# Patient Record
Sex: FEMALE | Race: WHITE | NOT HISPANIC OR LATINO | Employment: OTHER | URBAN - NONMETROPOLITAN AREA
[De-identification: names, ages, dates, MRNs, and addresses within clinical notes are randomized per-mention and may not be internally consistent; named-entity substitution may affect disease eponyms.]

---

## 2023-07-07 ENCOUNTER — OFFICE VISIT (OUTPATIENT)
Dept: URGENT CARE | Facility: CLINIC | Age: 83
End: 2023-07-07
Payer: MEDICARE

## 2023-07-07 VITALS
SYSTOLIC BLOOD PRESSURE: 116 MMHG | OXYGEN SATURATION: 97 % | WEIGHT: 143 LBS | HEART RATE: 60 BPM | TEMPERATURE: 97.7 F | RESPIRATION RATE: 18 BRPM | DIASTOLIC BLOOD PRESSURE: 56 MMHG

## 2023-07-07 DIAGNOSIS — K04.7 DENTAL INFECTION: Primary | ICD-10-CM

## 2023-07-07 PROBLEM — M15.9 GENERALIZED OSTEOARTHRITIS: Status: ACTIVE | Noted: 2023-01-10

## 2023-07-07 PROBLEM — F33.1 MODERATE RECURRENT MAJOR DEPRESSION (HCC): Status: ACTIVE | Noted: 2023-01-10

## 2023-07-07 PROBLEM — K21.9 GASTROESOPHAGEAL REFLUX DISEASE WITHOUT ESOPHAGITIS: Status: ACTIVE | Noted: 2023-01-10

## 2023-07-07 PROCEDURE — 99203 OFFICE O/P NEW LOW 30 MIN: CPT | Performed by: PHYSICIAN ASSISTANT

## 2023-07-07 PROCEDURE — G0463 HOSPITAL OUTPT CLINIC VISIT: HCPCS | Performed by: PHYSICIAN ASSISTANT

## 2023-07-07 RX ORDER — FERROUS SULFATE 325(65) MG
1 TABLET ORAL
COMMUNITY
Start: 2023-01-10

## 2023-07-07 RX ORDER — PANTOPRAZOLE SODIUM 40 MG/1
TABLET, DELAYED RELEASE ORAL
COMMUNITY
Start: 2023-04-16

## 2023-07-07 RX ORDER — ALENDRONATE SODIUM 70 MG/1
TABLET ORAL
COMMUNITY
Start: 2023-05-18

## 2023-07-07 RX ORDER — CLINDAMYCIN HYDROCHLORIDE 300 MG/1
300 CAPSULE ORAL 3 TIMES DAILY
Qty: 21 CAPSULE | Refills: 0 | Status: SHIPPED | OUTPATIENT
Start: 2023-07-07 | End: 2023-07-14

## 2023-07-07 RX ORDER — LISINOPRIL 20 MG/1
TABLET ORAL
COMMUNITY
Start: 2023-05-26

## 2023-07-07 RX ORDER — MELOXICAM 7.5 MG/1
TABLET ORAL
COMMUNITY
Start: 2023-05-30

## 2023-07-07 RX ORDER — OXYBUTYNIN CHLORIDE 15 MG/1
TABLET, EXTENDED RELEASE ORAL
COMMUNITY
Start: 2023-06-06

## 2023-07-07 RX ORDER — CITALOPRAM 20 MG/1
TABLET ORAL
COMMUNITY
Start: 2023-05-02

## 2023-07-07 NOTE — PATIENT INSTRUCTIONS
Dental Abscess   WHAT YOU NEED TO KNOW:   A dental abscess is a collection of pus in or around a tooth. A dental abscess is caused by bacteria. The bacteria can enter the tooth when the enamel (outer part of the tooth) is damaged by tooth decay. Bacteria can also enter the tooth through a chip in the tooth or a cut in the gum. Food particles that are stuck between the teeth for a long time may also lead to an abscess. DISCHARGE INSTRUCTIONS:   Return to the emergency department if:   You have severe pain in your tooth or jaw. You have trouble breathing because of pain or swelling. Call your doctor if:   Your symptoms get worse, even after treatment. Your mouth is bleeding. You cannot eat or drink because of pain or swelling. Your abscess returns. You have an injury that causes a crack in your tooth. You have questions or concerns about your condition or care. Medicines: You may  need any of the following:  Antibiotics  help treat a bacterial infection. NSAIDs , such as ibuprofen, help decrease swelling, pain, and fever. This medicine is available with or without a doctor's order. NSAIDs can cause stomach bleeding or kidney problems in certain people. If you take blood thinner medicine, always ask your healthcare provider if NSAIDs are safe for you. Always read the medicine label and follow directions. Acetaminophen  decreases pain and fever. It is available without a doctor's order. Ask how much to take and how often to take it. Follow directions. Read the labels of all other medicines you are using to see if they also contain acetaminophen, or ask your doctor or pharmacist. Acetaminophen can cause liver damage if not taken correctly. Prescription pain medicine  may be given. Ask your healthcare provider how to take this medicine safely. Some prescription pain medicines contain acetaminophen.  Do not take other medicines that contain acetaminophen without talking to your healthcare provider. Too much acetaminophen may cause liver damage. Prescription pain medicine may cause constipation. Ask your healthcare provider how to prevent or treat constipation. Take your medicine as directed. Contact your healthcare provider if you think your medicine is not helping or if you have side effects. Tell your provider if you are allergic to any medicine. Keep a list of the medicines, vitamins, and herbs you take. Include the amounts, and when and why you take them. Bring the list or the pill bottles to follow-up visits. Carry your medicine list with you in case of an emergency. Self-care:   Rinse your mouth every 2 hours with salt water. This will help keep the area clean. Gently brush your teeth twice a day with a soft tooth brush. This will help keep the area clean. Eat soft foods as directed. Soft foods may cause less pain. Examples include applesauce, yogurt, and cooked pasta. Ask your healthcare provider how long to follow this instruction. Apply a warm compress to your tooth or gum. Use a cotton ball or gauze soaked in warm water. Remove the compress in 10 minutes or when it becomes cool. Repeat 3 times a day. Prevent another abscess:   Brush your teeth at least 2 times a day  with fluoride toothpaste. Use dental floss at least once a day  to clean between your teeth. Rinse your mouth with water or mouthwash  after meals and snacks. Chew sugarless gum. Avoid sugary and starchy food that can stick between your teeth. Limit drinks high in sugar, such as soda or fruit juice. See your dentist every 6 months  for dental cleanings and oral exams. Follow up with your doctor or dentist in 24 hours, or as directed: Your healthcare provider will need to check your teeth and gums. Write down your questions so you remember to ask them during your visits.    © Copyright [employer] 2022 Information is for End User's use only and may not be sold, redistributed or otherwise used for commercial purposes. The above information is an  only. It is not intended as medical advice for individual conditions or treatments. Talk to your doctor, nurse or pharmacist before following any medical regimen to see if it is safe and effective for you.

## 2023-07-07 NOTE — PROGRESS NOTES
[facility]        NAME: Patel De Leon is a 82 y.o. female  : 1940    MRN: [id number]  DATE: 2023  TIME: 10:51 AM    Assessment and Plan   Dental infection [K04.7]  1. Dental infection  clindamycin (CLEOCIN) 300 MG capsule            Patient Instructions   Patient Instructions     Dental Abscess   WHAT YOU NEED TO KNOW:   A dental abscess is a collection of pus in or around a tooth. A dental abscess is caused by bacteria. The bacteria can enter the tooth when the enamel (outer part of the tooth) is damaged by tooth decay. Bacteria can also enter the tooth through a chip in the tooth or a cut in the gum. Food particles that are stuck between the teeth for a long time may also lead to an abscess. DISCHARGE INSTRUCTIONS:   Return to the emergency department if:   • You have severe pain in your tooth or jaw. • You have trouble breathing because of pain or swelling. Call your doctor if:   • Your symptoms get worse, even after treatment. • Your mouth is bleeding. • You cannot eat or drink because of pain or swelling. • Your abscess returns. • You have an injury that causes a crack in your tooth. • You have questions or concerns about your condition or care. Medicines: You may  need any of the following:  • Antibiotics  help treat a bacterial infection. • NSAIDs , such as ibuprofen, help decrease swelling, pain, and fever. This medicine is available with or without a doctor's order. NSAIDs can cause stomach bleeding or kidney problems in certain people. If you take blood thinner medicine, always ask your healthcare provider if NSAIDs are safe for you. Always read the medicine label and follow directions. • Acetaminophen  decreases pain and fever. It is available without a doctor's order. Ask how much to take and how often to take it. Follow directions.  Read the labels of all other medicines you are using to see if they also contain acetaminophen, or ask your doctor or pharmacist. Acetaminophen can cause liver damage if not taken correctly. • Prescription pain medicine  may be given. Ask your healthcare provider how to take this medicine safely. Some prescription pain medicines contain acetaminophen. Do not take other medicines that contain acetaminophen without talking to your healthcare provider. Too much acetaminophen may cause liver damage. Prescription pain medicine may cause constipation. Ask your healthcare provider how to prevent or treat constipation. • Take your medicine as directed. Contact your healthcare provider if you think your medicine is not helping or if you have side effects. Tell your provider if you are allergic to any medicine. Keep a list of the medicines, vitamins, and herbs you take. Include the amounts, and when and why you take them. Bring the list or the pill bottles to follow-up visits. Carry your medicine list with you in case of an emergency. Self-care:   • Rinse your mouth every 2 hours with salt water. This will help keep the area clean. • Gently brush your teeth twice a day with a soft tooth brush. This will help keep the area clean. • Eat soft foods as directed. Soft foods may cause less pain. Examples include applesauce, yogurt, and cooked pasta. Ask your healthcare provider how long to follow this instruction. • Apply a warm compress to your tooth or gum. Use a cotton ball or gauze soaked in warm water. Remove the compress in 10 minutes or when it becomes cool. Repeat 3 times a day. Prevent another abscess:   • Brush your teeth at least 2 times a day  with fluoride toothpaste. • Use dental floss at least once a day  to clean between your teeth. • Rinse your mouth with water or mouthwash  after meals and snacks. Chew sugarless gum. • Avoid sugary and starchy food that can stick between your teeth. Limit drinks high in sugar, such as soda or fruit juice.     • See your dentist every 6 months for dental cleanings and oral exams. Follow up with your doctor or dentist in 24 hours, or as directed: Your healthcare provider will need to check your teeth and gums. Write down your questions so you remember to ask them during your visits. © Copyright [employer] 2022 Information is for End User's use only and may not be sold, redistributed or otherwise used for commercial purposes. The above information is an  only. It is not intended as medical advice for individual conditions or treatments. Talk to your doctor, nurse or pharmacist before following any medical regimen to see if it is safe and effective for you. Follow up with PCP in 3-5 days. Proceed to  ER if symptoms worsen. Chief Complaint     Chief Complaint   Patient presents with   • Oral Pain         History of Present Illness       Patient is an 82-year-old female who presents to the clinic for pain in the left lower jaw for approximately 2 days. She states that she does have partial dentures and her teeth have been bothering her. She does also have pain with swallowing but denies URI symptoms such as runny nose, cough, fevers or chills. She is currently visiting family from [place]. Review of Systems   Review of Systems   Constitutional: Negative for chills and fever. HENT: Positive for dental problem and sore throat. Negative for ear pain, hearing loss, mouth sores, nosebleeds, postnasal drip, rhinorrhea, sinus pressure, sinus pain and sneezing. Eyes: Negative for pain and visual disturbance. Respiratory: Negative for cough and shortness of breath. Cardiovascular: Negative for chest pain and palpitations. Gastrointestinal: Negative for abdominal pain, anal bleeding, blood in stool, constipation, diarrhea, nausea, rectal pain and vomiting. Genitourinary: Negative for dysuria and hematuria. Musculoskeletal: Negative for arthralgias and back pain. Skin: Negative for color change and rash. Neurological: Negative for dizziness, seizures, syncope and headaches. All other systems reviewed and are negative. Current Medications       Current Outpatient Medications:   •  clindamycin (CLEOCIN) 300 MG capsule, Take 1 capsule (300 mg total) by mouth 3 (three) times a day for 7 days, Disp: 21 capsule, Rfl: 0  •  ferrous sulfate 325 (65 Fe) mg tablet, Take 1 tablet by mouth daily with breakfast, Disp: , Rfl:   •  alendronate (FOSAMAX) 70 mg tablet, , Disp: , Rfl:   •  citalopram (CeleXA) 20 mg tablet, , Disp: , Rfl:   •  lisinopril (ZESTRIL) 20 mg tablet, , Disp: , Rfl:   •  meloxicam (MOBIC) 7.5 mg tablet, , Disp: , Rfl:   •  metoprolol tartrate (LOPRESSOR) 25 mg tablet, Take 25 mg by mouth, Disp: , Rfl:   •  oxybutynin (DITROPAN XL) 15 MG 24 hr tablet, , Disp: , Rfl:   •  pantoprazole (PROTONIX) 40 mg tablet, , Disp: , Rfl:     Current Allergies     Allergies as of 07/07/2023 - Reviewed 07/07/2023   Allergen Reaction Noted   • Prochlorperazine Anaphylaxis and Throat Swelling 11/01/2021            The following portions of the patient's history were reviewed and updated as appropriate: allergies, current medications, past family history, past medical history, past social history, past surgical history and problem list.     Past Medical History:   Diagnosis Date   • Arthritis        History reviewed. No pertinent surgical history. History reviewed. No pertinent family history. Medications have been verified. Objective   /56   Pulse 60   Temp 97.7 °F (36.5 °C)   Resp 18   Wt 64.9 kg (143 lb)   SpO2 97%        Physical Exam     Physical Exam  Constitutional:       Appearance: She is well-developed. She is not diaphoretic. HENT:      Head: Normocephalic. Right Ear: Tympanic membrane normal.      Left Ear: Tympanic membrane normal.      Nose: No congestion or rhinorrhea. Mouth/Throat:      Mouth: Mucous membranes are moist. Mucous membranes are pale.       Dentition: Abnormal dentition. Does not have dentures. Dental tenderness, gingival swelling and dental caries present. No dental abscesses or gum lesions. Pharynx: No uvula swelling. Comments: - There is tenderness noted in the left lower jaw  Eyes:      General:         Right eye: No discharge. Left eye: No discharge. Pupils: Pupils are equal, round, and reactive to light. Neck:      Thyroid: No thyromegaly. Cardiovascular:      Rate and Rhythm: Normal rate. Heart sounds: No murmur heard. Pulmonary:      Effort: Pulmonary effort is normal. No respiratory distress. Breath sounds: No wheezing or rales. Chest:      Chest wall: No tenderness. Abdominal:      General: There is no distension. Palpations: Abdomen is soft. Tenderness: There is no abdominal tenderness. There is no guarding or rebound. Musculoskeletal:         General: Normal range of motion. Cervical back: Normal range of motion. Lymphadenopathy:      Cervical: Cervical adenopathy present. Left cervical: Superficial cervical adenopathy present. No deep or posterior cervical adenopathy. Skin:     General: Skin is warm. Neurological:      Mental Status: She is alert and oriented to person, place, and time.